# Patient Record
Sex: MALE | Race: WHITE | URBAN - METROPOLITAN AREA
[De-identification: names, ages, dates, MRNs, and addresses within clinical notes are randomized per-mention and may not be internally consistent; named-entity substitution may affect disease eponyms.]

---

## 2018-04-26 ENCOUNTER — OFFICE VISIT (OUTPATIENT)
Dept: FAMILY MEDICINE | Facility: CLINIC | Age: 28
End: 2018-04-26
Payer: COMMERCIAL

## 2018-04-26 VITALS
HEART RATE: 88 BPM | DIASTOLIC BLOOD PRESSURE: 75 MMHG | BODY MASS INDEX: 21.2 KG/M2 | TEMPERATURE: 98.3 F | HEIGHT: 73 IN | SYSTOLIC BLOOD PRESSURE: 120 MMHG | OXYGEN SATURATION: 99 % | WEIGHT: 160 LBS | RESPIRATION RATE: 25 BRPM

## 2018-04-26 DIAGNOSIS — R07.89 CHEST DISCOMFORT: Primary | ICD-10-CM

## 2018-04-26 LAB
BASOPHILS # BLD AUTO: 0 10E9/L (ref 0–0.2)
BASOPHILS NFR BLD AUTO: 0.5 %
DIFFERENTIAL METHOD BLD: NORMAL
EOSINOPHIL # BLD AUTO: 0.3 10E9/L (ref 0–0.7)
EOSINOPHIL NFR BLD AUTO: 5.7 %
ERYTHROCYTE [DISTWIDTH] IN BLOOD BY AUTOMATED COUNT: 12.5 % (ref 10–15)
HCT VFR BLD AUTO: 43.8 % (ref 40–53)
HGB BLD-MCNC: 15.1 G/DL (ref 13.3–17.7)
LYMPHOCYTES # BLD AUTO: 2.1 10E9/L (ref 0.8–5.3)
LYMPHOCYTES NFR BLD AUTO: 36.6 %
MCH RBC QN AUTO: 30 PG (ref 26.5–33)
MCHC RBC AUTO-ENTMCNC: 34.5 G/DL (ref 31.5–36.5)
MCV RBC AUTO: 87 FL (ref 78–100)
MONOCYTES # BLD AUTO: 0.7 10E9/L (ref 0–1.3)
MONOCYTES NFR BLD AUTO: 11.8 %
NEUTROPHILS # BLD AUTO: 2.7 10E9/L (ref 1.6–8.3)
NEUTROPHILS NFR BLD AUTO: 45.4 %
PLATELET # BLD AUTO: 220 10E9/L (ref 150–450)
RBC # BLD AUTO: 5.04 10E12/L (ref 4.4–5.9)
WBC # BLD AUTO: 5.8 10E9/L (ref 4–11)

## 2018-04-26 PROCEDURE — 99213 OFFICE O/P EST LOW 20 MIN: CPT | Performed by: PHYSICIAN ASSISTANT

## 2018-04-26 PROCEDURE — 84443 ASSAY THYROID STIM HORMONE: CPT | Performed by: PHYSICIAN ASSISTANT

## 2018-04-26 PROCEDURE — 36415 COLL VENOUS BLD VENIPUNCTURE: CPT | Performed by: PHYSICIAN ASSISTANT

## 2018-04-26 PROCEDURE — 85025 COMPLETE CBC W/AUTO DIFF WBC: CPT | Performed by: PHYSICIAN ASSISTANT

## 2018-04-26 NOTE — MR AVS SNAPSHOT
After Visit Summary   2018    García Jacinto    MRN: [id number]           Patient Information     Date Of Birth          1990        Visit Information        Provider Department      2018 2:20 PM Khadra Blackwood PA-C [facility]        Today's Diagnoses     Chest discomfort    -  1      Care Instructions    Cleared for proposed dental procedure.    Labs updated today.    Patient to call [facility] [phone] for Stress Test at your convenience.    Return to clinic with any worsening or changes in symptoms and follow up with PCP for routine care.           Follow-ups after your visit        Follow-up notes from your care team     Return if symptoms worsen or fail to improve.      Future tests that were ordered for you today     Open Future Orders        Priority Expected Expires Ordered    Exercise Stress Echocardiogram Routine  2019            Who to contact     If you have questions or need follow up information about today's clinic visit or your schedule please contact [facility] directly at [phone].  Normal or non-critical lab and imaging results will be communicated to you by [employer], letter or phone within 4 business days after the clinic has received the results. If you do not hear from us within 7 days, please contact the clinic through [employer] or phone. If you have a critical or abnormal lab result, we will notify you by phone as soon as possible.  Submit refill requests through [employer] or call your pharmacy and they will forward the refill request to us. Please allow 3 business days for your refill to be completed.          Additional Information About Your Visit        [employer] Information     [employer] gives you secure access to your electronic health record. If you see a primary care provider, you can also send messages to your care team and make appointments. If you have questions, please call your  "primary care clinic.  If you do not have a primary care provider, please call [phone] and they will assist you.        Care EveryWhere ID     This is your Care EveryWhere ID. This could be used by other organizations to access your [facility] medical records  [id number]        Your Vitals Were     Pulse Temperature Respirations Height Pulse Oximetry BMI (Body Mass Index)    88 98.3  F (36.8  C) (Oral) 25 6' 1\" (1.854 m) 99% 21.11 kg/m2       Blood Pressure from Last 3 Encounters:   04/26/18 120/75    Weight from Last 3 Encounters:   04/26/18 160 lb (72.6 kg)              We Performed the Following     CBC with platelets differential     TSH with free T4 reflex        Primary Care Provider Office Phone # Fax #    Khadra Blackwood PA-C [phone]       [address]  [facility] [place] [address]        Equal Access to Services     [facility] : Hadii aad ku hadasho Soanneali, waaxda luqadaha, qaybta kaalmada adeegyada, waxay alyssain hayalycian durga croreia . So [facility] [phone].    ATENCIÓN: Si habla español, tiene a hobbs disposición servicios gratuitos de asistencia lingüística. Llame al [phone].    We comply with applicable federal civil rights laws and [place] laws. We do not discriminate on the basis of race, color, national origin, age, disability, sex, sexual orientation, or gender identity.            Thank you!     Thank you for choosing [facility]  for your care. Our goal is always to provide you with excellent care. Hearing back from our patients is one way we can continue to improve our services. Please take a few minutes to complete the written survey that you may receive in the mail after your visit with us. Thank you!             Your Updated Medication List - Protect others around you: Learn how to safely use, store and throw away your medicines at www.disposemymeds.org.      Notice  As of 4/26/2018  2:52 PM    You have not been prescribed any " medications.

## 2018-04-26 NOTE — PROGRESS NOTES
SUBJECTIVE:   García Jacinto is a 27 year old male who presents to clinic today for the following health issues:      Hypotension Follow-up      Outpatient blood pressures are being checked at dentist.  Results are low.    Low Salt Diet: not monitoring salt      Amount of exercise or physical activity: 2-3 days/week for an average of 45-60 minutes    Problems taking medications regularly: No    Medication side effects: none    Diet: low salt    Family history of HYPERTENSION - uncle    Chest pain in left pectoral muscle/chest area. Last episode happened 2 months ago. Squeezing type pain, will go away with rest and deep breaths. Has been happening occasionally for almost 10 years. History of work up initially but not very thorough. No specific triggers.    Patient passes out when patient donates blood    Patient thinks he may have had a tetanus booster in 2016.  Fasting labs done in 2016 as well - within normal limits.    Needs forms completed for wisdom teeth extraction with local anesthesia.    Problem list and histories reviewed & adjusted, as indicated.  Additional history: as documented    There is no problem list on file for this patient.    History reviewed. No pertinent surgical history.    Social History   Substance Use Topics     Smoking status: Never Smoker     Smokeless tobacco: Never Used     Alcohol use Yes      Comment: 2 per week     History reviewed. No pertinent family history.      No current outpatient prescriptions on file.     No Known Allergies  BP Readings from Last 3 Encounters:   04/26/18 120/75    Wt Readings from Last 3 Encounters:   04/26/18 160 lb (72.6 kg)              Reviewed and updated as needed this visit by clinical staff  Tobacco  Allergies  Meds  Med Hx  Surg Hx  Fam Hx  Soc Hx      Reviewed and updated as needed this visit by Provider         ROS:  Constitutional, HEENT, cardiovascular, pulmonary, GI, , musculoskeletal, neuro, skin, endocrine and psych systems are  "negative, except as otherwise noted.    OBJECTIVE:     /75 (BP Location: Left arm, Patient Position: Sitting, Cuff Size: Adult Regular)  Pulse 88  Temp 98.3  F (36.8  C) (Oral)  Resp 25  Ht 6' 1\" (1.854 m)  Wt 160 lb (72.6 kg)  SpO2 99%  BMI 21.11 kg/m2  Body mass index is 21.11 kg/(m^2).  GENERAL: healthy, alert and no distress  NECK: no adenopathy, no asymmetry, masses, or scars and thyroid normal to palpation  RESP: lungs clear to auscultation - no rales, rhonchi or wheezes  CV: regular rate and rhythm, normal S1 S2, no S3 or S4, no murmur, click or rub, no peripheral edema and peripheral pulses strong  PSYCH: mentation appears normal, affect normal/bright    Diagnostic Test Results:  none     ASSESSMENT/PLAN:       ICD-10-CM    1. Chest discomfort R07.89 Exercise Stress Echocardiogram     CBC with platelets differential     TSH with free T4 reflex       Patient Instructions   Cleared for proposed dental procedure.    Labs updated today.    Patient to call [facility] [phone] for Stress Test at your convenience.    Return to clinic with any worsening or changes in symptoms and follow up with PCP for routine care.       Khadra Blackwood PA-C  [facility]  "

## 2018-04-26 NOTE — PATIENT INSTRUCTIONS
Cleared for proposed dental procedure.    Labs updated today.    Patient to call [facility] [phone] for Stress Test at your convenience.    Return to clinic with any worsening or changes in symptoms and follow up with PCP for routine care.

## 2018-04-27 LAB — TSH SERPL DL<=0.005 MIU/L-ACNC: 1.98 MU/L (ref 0.4–4)

## 2018-04-27 NOTE — PROGRESS NOTES
"Niru Mariano  Your attached labs are normal. Keep up the good work!  Please contact the office with any questions or concerns.    Khadra [place] \"Roger\" ESTRELLA Blackwood  "

## 2020-03-11 ENCOUNTER — HEALTH MAINTENANCE LETTER (OUTPATIENT)
Age: 30
End: 2020-03-11

## 2021-01-03 ENCOUNTER — HEALTH MAINTENANCE LETTER (OUTPATIENT)
Age: 31
End: 2021-01-03

## 2021-04-25 ENCOUNTER — HEALTH MAINTENANCE LETTER (OUTPATIENT)
Age: 31
End: 2021-04-25

## 2021-10-10 ENCOUNTER — HEALTH MAINTENANCE LETTER (OUTPATIENT)
Age: 31
End: 2021-10-10

## 2022-05-21 ENCOUNTER — HEALTH MAINTENANCE LETTER (OUTPATIENT)
Age: 32
End: 2022-05-21

## 2022-09-18 ENCOUNTER — HEALTH MAINTENANCE LETTER (OUTPATIENT)
Age: 32
End: 2022-09-18

## 2023-06-04 ENCOUNTER — HEALTH MAINTENANCE LETTER (OUTPATIENT)
Age: 33
End: 2023-06-04